# Patient Record
Sex: MALE | Race: WHITE | URBAN - METROPOLITAN AREA
[De-identification: names, ages, dates, MRNs, and addresses within clinical notes are randomized per-mention and may not be internally consistent; named-entity substitution may affect disease eponyms.]

---

## 2020-02-04 ENCOUNTER — OFFICE VISIT (OUTPATIENT)
Dept: DERMATOLOGY | Facility: AMBULATORY SURGERY CENTER | Age: 85
End: 2020-02-04

## 2020-02-04 VITALS
OXYGEN SATURATION: 96 % | HEIGHT: 68 IN | SYSTOLIC BLOOD PRESSURE: 124 MMHG | WEIGHT: 140 LBS | TEMPERATURE: 97.9 F | BODY MASS INDEX: 21.22 KG/M2 | DIASTOLIC BLOOD PRESSURE: 62 MMHG | HEART RATE: 58 BPM

## 2020-02-04 DIAGNOSIS — C44.219 BASAL CELL CARCINOMA (BCC) OF HELIX OF LEFT EAR: Primary | ICD-10-CM

## 2020-02-04 RX ORDER — OMEPRAZOLE 20 MG/1
20 CAPSULE, DELAYED RELEASE ORAL
COMMUNITY

## 2020-02-04 RX ORDER — FINASTERIDE 5 MG/1
5 TABLET, FILM COATED ORAL
COMMUNITY

## 2020-02-04 RX ORDER — PRAVASTATIN SODIUM 80 MG/1
80 TABLET ORAL
COMMUNITY

## 2020-02-04 RX ORDER — CEPHALEXIN 500 MG/1
500 CAPSULE ORAL 3 TIMES DAILY
Qty: 21 CAP | Refills: 0 | Status: SHIPPED | OUTPATIENT
Start: 2020-02-04 | End: 2020-02-11

## 2020-02-04 RX ORDER — ACETAMINOPHEN 325 MG/1
TABLET ORAL
COMMUNITY

## 2020-02-04 RX ORDER — HYDROCHLOROTHIAZIDE 25 MG/1
25 TABLET ORAL
COMMUNITY

## 2020-02-04 RX ORDER — POTASSIUM CHLORIDE 750 MG/1
10 CAPSULE, EXTENDED RELEASE ORAL
COMMUNITY

## 2020-02-04 RX ORDER — ACETAMINOPHEN 500 MG
TABLET ORAL
COMMUNITY

## 2020-02-04 RX ORDER — TERAZOSIN 2 MG/1
2 CAPSULE ORAL
COMMUNITY

## 2020-02-04 RX ORDER — METOPROLOL TARTRATE 25 MG/1
25 TABLET, FILM COATED ORAL
COMMUNITY

## 2020-02-04 RX ORDER — ISOSORBIDE MONONITRATE 30 MG/1
30 TABLET, EXTENDED RELEASE ORAL
COMMUNITY

## 2020-02-04 RX ORDER — OXYCODONE AND ACETAMINOPHEN 10; 325 MG/1; MG/1
1 TABLET ORAL
COMMUNITY

## 2020-02-04 RX ORDER — DOCUSATE SODIUM 100 MG/1
100 CAPSULE, LIQUID FILLED ORAL 2 TIMES DAILY
COMMUNITY

## 2020-02-04 RX ORDER — LEVOTHYROXINE SODIUM 50 UG/1
50 TABLET ORAL
COMMUNITY

## 2020-02-04 RX ORDER — CLOPIDOGREL BISULFATE 75 MG/1
75 TABLET ORAL
COMMUNITY

## 2020-02-04 RX ORDER — AMLODIPINE BESYLATE 5 MG/1
5 TABLET ORAL
COMMUNITY

## 2020-02-04 NOTE — PROGRESS NOTES
Yvette Salazar is a 80 y.o. male who is seen post-Mohs surgery to evaluate:  Chief Complaint:  Wound resulting from surgical extirpation of a skin cancer. 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 The size of the defect is 2.5 x 2.1 cm. The site is bleeding and is not painful. The tumor was removed on 2/4/2020. Outpatient Encounter Medications as of 2/4/2020   Medication Sig Dispense Refill    amLODIPine (NORVASC) 5 mg tablet Take 5 mg by mouth.  terazosin (HYTRIN) 2 mg capsule Take 2 mg by mouth.  pravastatin (PRAVACHOL) 80 mg tablet Take 80 mg by mouth.  hydroCHLOROthiazide (HYDRODIURIL) 25 mg tablet Take 25 mg by mouth.  levothyroxine (SYNTHROID) 50 mcg tablet Take 50 mcg by mouth.  metoprolol tartrate (LOPRESSOR) 25 mg tablet Take 25 mg by mouth.  potassium chloride SA (MICRO-K) 10 mEq capsule Take 10 mEq by mouth.  omeprazole (PRILOSEC) 20 mg capsule Take 20 mg by mouth.  isosorbide mononitrate ER (IMDUR) 30 mg tablet Take 30 mg by mouth.  cholecalciferol (VITAMIN D3) (2,000 UNITS /50 MCG) cap capsule Take  by mouth.  finasteride (PROSCAR) 5 mg tablet Take 5 mg by mouth.  clopidogreL (PLAVIX) 75 mg tab Take 75 mg by mouth.  oxyCODONE-acetaminophen (PERCOCET 10)  mg per tablet Take 1 Tab by mouth.  acetaminophen (TYLENOL) 325 mg tablet Take  by mouth every four (4) hours as needed for Pain.  docusate sodium (COLACE) 100 mg capsule Take 100 mg by mouth two (2) times a day.  cephALEXin (KEFLEX) 500 mg capsule Take 1 Cap by mouth three (3) times daily for 7 days. 21 Cap 0     No facility-administered encounter medications on file as of 2/4/2020.       Allergies   Allergen Reactions    Ativan [Lorazepam] Other (comments)    Clonidine Other (comments)    Codeine Other (comments)    Lisinopril Other (comments)    Niacin Other (comments)    Prednisone Other (comments)       Social History     Tobacco Use   Smoking Status Former Smoker       Physical Exam  HENT:      Head: Normocephalic. Pulmonary:      Effort: Pulmonary effort is normal.   Skin:     Comments: On the left anterior helix there is a broad surgical defect extending focally through cartilage to postauricular skin. Helical rim cartilage is intact structures stable. The majority of the cartilage has intact perichondrium. Neurological:      Mental Status: He is alert and oriented to person, place, and time. Evaluation and Management:  There are symptomatic surgical defects as noted above. Treatment options discussed with the patient include second intent healing, primary closure, adjacent tissue transfer (flap), and skin graft (full or split thickness) without cartilage grafting. Following evaluation of the defect(s) and discussion with the patient regarding risks and benefits of the various options, the plan to repair the defect with a(n) split-thickness skin graft was chosen. This repair was chosen in order to avoid excessive wound tension, preserve anatomic form or function and avoid free margin distortion. The wound management options of second intent healing, layered closure, local flap, or skin graft were discussed. Mr. Kelly Goyal understands the aims, risks, alternatives, and possible complications and elects to proceed with a split thickness skin graft. Mr. Kelly Goyal was placed in a supine position on the operating table in the Mohs surgery procedure room. The area was prepped and draped in the standard manner. A template of donor skin the size of the wound was drawn at the mastoid skin. 1% lidocaine with epinephrine 1:100,000 was used to anesthetize the donor area. The graft was removed from the donor site in split-thickness fashion using a flexible blade. Hemostasis was obtained with spot electrocoagulation and pressure.   The graft was placed on the recipient site, trimmed to size and anchored in place with a running 5-0 fast absorbing gut suture around the margin. A bolster was applied. The size of the graft was 2.5 x 2.1 cm. A pressure dressing utilizing Petrolatum ointment, Telfa, gauze and Coverroll was placed on the donor site. Wound care instructions (written and/or verbal) and a follow up appointment were given to the patient before discharge. Mr. Errol Wick was discharged in good condition. Follow-up and Dispositions    · Return in about 1 week (around 2/11/2020) for Bolster removal.         Amberly Yanez MD    [address]   OFFICE PROCEDURE PROGRESS NOTE   Chart reviewed for the following:   Imer Clemons MD have reviewed the History, Physical and updated the Allergic reactions for [facility]. TIME OUT performed immediately prior to start of procedure:   Imer Clemons MD, have performed the following reviews on [facility]   prior to the start of the procedure:     * Patient was identified by name and date of birth   * Agreement on procedure being performed was verified   * Risks and Benefits explained to the patient   * Procedure site verified and marked as necessary   * Patient was positioned for comfort   * Consent was signed and verified     Time: 8 AM  Date of procedure: 2/4/2020  Procedure performed by:  Cherelle Lopez MD  Provider assisted by: Anurag Rooney LPN  Patient assisted by: self   How tolerated by patient: tolerated the procedure well with no complications   Comments: none

## 2020-02-04 NOTE — PATIENT INSTRUCTIONS
WOUND CARE INSTRUCTIONS     1. Keep the dressing clean and dry and do not remove for 1 week. 2. Watch for:  BLEEDING: A small amount of drainage may occur. If bleeding occurs, elevate and rest the surgery site. Apply gauze and steady pressure for 20 minutes. If bleeding continues repeat pressure once more. If bleeding still does not stop, call this office. If after hours, call Dr. Deisy Dias at [phone]. INFECTION: Signs of infection include increased redness, pain, warmth, drainage of pus, and fever. If this occurs, please call this office. 3. Special Instructions (follow any that are checked):  · [x] You have stitches that DO NOT need to be removed. · [x] Avoid bending at the waist and heavy lifting for two days. · [x] Sleep with your head elevated for the next two nights. · [] Rest the surgery site and keep it elevated as much as possible for two days. · [] You may apply an ice-pack for 10-15 minutes every waking hour for the rest of the day. · [] Eat a soft diet and avoid hot food and hot drinks for the rest of the day. · [] Other instructions: Follow up as directed. Take Tylenol  for pain as needed. Once the site is healed with no remaining bandages or open areas, protect your surgical site and scar from the sun, as this area will be more sensitive. Use a broad spectrum sunscreen SPF 30 or higher daily, and a chemical free product (one containing zinc oxide or titanium dioxide) is a good choice if the area is sensitive. You may begin to gently massage the surgical site in 3 weeks, rubbing in a circular motion along the scar. This can help reduce swelling and thickness of a scar. If you have any questions or concerns, please call our office Monday through Friday at [phone]. If after hours, please call Dr. Deisy Dias at [phone].

## 2020-02-04 NOTE — LETTER
2/4/2020 3:21 PM 
 
Patient:  Jakob Yanes YOB: 1931 Date of Visit: 2/4/2020 Dear Rosie Bell MD 
[address] [facility] [address] [place] [address] [phone] VIA Facsimile: [phone] Thank you for referring Jakob Yanes to me for evaluation/treatment. Below are the relevant portions of my assessment and plan of care. Mr. Bennie Sahu presented today for Mohs surgery to treat a biopsy-proven basal cell carcinoma of the left ear. 2 stage(s) of Mohs surgery were required to achieve tumor free margins. I repaired the defect with a(n) skin graft. He tolerated the procedure well. Please see the attached procedure note(s) for additional details. Mr. Bennie Sahu will return to me for suture removal and/or wound checks at an appropriate interval and I will follow-up with him regarding any issues arising from or relating to this surgery. I will otherwise defer any additional dermatologic care back to you. If you have questions, please do not hesitate to call me. I look forward to following Mr. Lundy along with you. Sincerely, Ilda Licona MD 
[phone] (cell)

## 2020-02-04 NOTE — PROGRESS NOTES
Progress note for Mohs surgery patient:    Chief Complaint:  basal cell carcinoma of the left ear    HPI:  Tommie Carlson is a 80y.o. year old male referred by  Yamile Benitez MD for Mohs surgery to treat the following lesion:  Lesion Info  Location: left ear  Size: 2.3 cm x 1.2 cm  Type: basal  Duration: months  Path Lab: [facility]  Path #: [id number]  Prior Treatment: none     Symptoms of the lesion include none. ROS:  Madhu Franklin is feeling well and in their usual state of health today. He is not in pain. He does not have any other skin concerns. Exam:  Madhu Franklin is an awake, alert, oriented, well-appearing male in no distress. There is not preauricular, submandibular, or cervical lymphadenopathy. The face was examined. Findings are:  On the left anterior helix there is a broad pink sclerotic plaque. A/P:  basal cell carcinoma of the left ear. The diagnosis was reviewed. The Mohs surgery procedure was reviewed. Indications, risks, and options were discussed with Mr. Adi Joseph preoperatively. Risks including, but not limited to: pain, bleeding, infection, tumor recurrence, scarring and damage to motor and/or sensory nerves, were discussed. Mr. Adi Joseph chose Mohs surgery. Mr. Adi Joseph was an acceptable surgery candidate. I performed Mohs surgery using standard technique after verbal and written consent were obtained. The lesion was identified and confirmed with the patient and photograph, if available. The surgical site was marked with gentian violet, prepped, draped and anesthetized in standard fashion. The tumor was debulked by curettage and orientation hashes were placed. The tumor and any associated scar was excised using beveled incision. Hemostasis was achieved, the site was bandaged, and the tissue was transported to the Mohs lab. While maintaining anatomic orientation the tissue was divided, if needed, and marked with colored inks that were noted on the corresponding Mohs map.   The tissue was prepared by Mohs en-face technique for fresh frozen section analysis. The resulting slides were examined for residual tumor, scar and other concerns, all of which were marked on the corresponding Mohs map, if present. The Mohs map was used to guide subsequent stages of surgery, if necessary, and the above process was repeated until a tumor-free plane was achieved. Once the tumor was cleared the map was marked as such and signed. Dr. Mell Edward acted as surgeon and pathologist for the entire case, performing all stages of the surgical excision as well as examination and interpretation of the histologic slides. See table below for details regarding the surgical case. 2 stage(s) were required to reach a tumor-free plane, resulting in a 2.5 cm x 2.1 cm defect extending to the cartiledge. There were not complications. Irene Arias will follow up as needed in the postoperative period. Regular skin examinations will be with  Dermatology at the [facility]. See repair note for reconstructive details.             left ear  Mohs Lesion Operative Report  Date: 02/04/20  Room: Procedure room 1  Indications: Site, Size, Poor definition  Pre-op Meds: none  Pre-op BP: 124/62  Pre-op pulse: 58  1st Assistant: Josef Molina  Stage #: 2  Stage 1 Sections: 1  Stage 1 # Pos: 1  Stage 2 Sections: 1  Stage 2 # POS: 0  Perineural Involvement: No  Lymphadenopathy: No  Defect Size: 2.5 cm x 2.1 cm  Depth: cartiledge  Wound Mgt: graft  Donor Site: mastoid skin  Suture: Graft  Graft details: 5.0 fast gut  Closure Length: 2.5 cm x 2.1 cm  Estimated Blood Loss: 1 ml  Hemostasis: Electrosurgery, Pressure  Anesthesia: 1% Lidocaine w/1:100,000 epi  1% Lidocaine: 6 cc  Complications: none  Dressing: pressure  Pos-op Meds: keflex  W/C Instructions: Verbal, Written  Follow-up: 1 week     [facility]   OFFICE PROCEDURE PROGRESS NOTE   Chart reviewed for the following:   Michele Almaraz MD have reviewed the History, Physical and updated the Allergic reactions for Fela Zhou. TIME OUT performed immediately prior to start of procedure:   Anuja Jarrett MD, have performed the following reviews on Fela Zhou   prior to the start of the procedure:     * Patient was identified by name and date of birth   * Agreement on procedure being performed was verified   * Risks and Benefits explained to the patient   * Procedure site verified and marked as necessary   * Patient was positioned for comfort   * Consent was signed and verified     Time: 8 AM  Date of procedure: 2/4/2020  Procedure performed by:  Stephanie Lozada MD  Provider assisted by: Bradford Price  Patient assisted by: self   How tolerated by patient: tolerated the procedure well with no complications   Comments: none

## 2020-02-11 ENCOUNTER — OFFICE VISIT (OUTPATIENT)
Dept: DERMATOLOGY | Facility: AMBULATORY SURGERY CENTER | Age: 85
End: 2020-02-11

## 2020-02-11 DIAGNOSIS — C44.219 BASAL CELL CARCINOMA (BCC) OF HELIX OF LEFT EAR: Primary | ICD-10-CM

## 2020-02-11 NOTE — PROGRESS NOTES
Wound check/suture removal:    Chief complaint: wound check. HPI: [place] Cuadra presents for wound check following Mohs surgery to treat a biopsy-proven basal cell carcinoma on the left helix repaired with a skin graft performed 1 week ago. Exam: The surgical site was examined. There is not evidence of infection. There is erythema. There is not edema. A/P:  Wound check. The surgical site is healing well, however there are some small areas of hematoma under the graft. Additional care was reviewed including liberal application of Vaseline several times daily to the donor site and protection of the graft site. Follow up will be in 1 week.

## 2020-02-18 ENCOUNTER — OFFICE VISIT (OUTPATIENT)
Dept: DERMATOLOGY | Facility: AMBULATORY SURGERY CENTER | Age: 85
End: 2020-02-18

## 2020-02-18 DIAGNOSIS — C44.219 BASAL CELL CARCINOMA (BCC) OF HELIX OF LEFT EAR: Primary | ICD-10-CM

## 2020-02-18 RX ORDER — CEPHALEXIN 500 MG/1
500 CAPSULE ORAL 3 TIMES DAILY
Qty: 21 CAP | Refills: 0 | Status: SHIPPED | OUTPATIENT
Start: 2020-02-18 | End: 2020-02-25

## 2020-02-18 NOTE — PROGRESS NOTES
Wound check/suture removal:    Chief complaint: wound check. HPI: [place]on Angeles presents for wound check following Mohs surgery to treat a basal cell carcinoma on the left ear repaired with a skin graft performed 2 weeks ago. Exam: The surgical site was examined. There is not evidence of infection. There is erythema. There is not edema. A/P:  Wound check. The surgical site is healing well. Additional care was reviewed including liberal application of Vaseline several times daily vinegar soaks. Follow up will be in 1 week. Keflex was restarted today for 1 week.

## 2020-02-25 ENCOUNTER — OFFICE VISIT (OUTPATIENT)
Dept: DERMATOLOGY | Facility: AMBULATORY SURGERY CENTER | Age: 85
End: 2020-02-25

## 2020-02-25 DIAGNOSIS — C44.219 BASAL CELL CARCINOMA (BCC) OF HELIX OF LEFT EAR: Primary | ICD-10-CM

## 2020-02-25 NOTE — PROGRESS NOTES
Wound check/suture removal:    Chief complaint: wound check. HPI: [place] Hernandez presents for wound check following Mohs surgery to treat a biopsy-proven basal cell carcinoma on the left helix repaired with a skin graft performed 3 weeks ago. Exam: The surgical site was examined. There is not evidence of infection. There is erythema. There is not edema. A/P:  Wound check. The surgical site is healing well. Additional care was reviewed including liberal application of Vaseline several times daily and vinegar soaks. Follow up will be in 1 month.

## 2023-05-20 RX ORDER — PSEUDOEPHEDRINE HCL 30 MG
100 TABLET ORAL 2 TIMES DAILY
COMMUNITY

## 2023-05-20 RX ORDER — POTASSIUM CHLORIDE 750 MG/1
10 CAPSULE, EXTENDED RELEASE ORAL
COMMUNITY

## 2023-05-20 RX ORDER — ACETAMINOPHEN 325 MG/1
TABLET ORAL EVERY 4 HOURS PRN
COMMUNITY

## 2023-05-20 RX ORDER — OMEPRAZOLE 20 MG/1
20 CAPSULE, DELAYED RELEASE ORAL
COMMUNITY

## 2023-05-20 RX ORDER — CLOPIDOGREL BISULFATE 75 MG/1
75 TABLET ORAL
COMMUNITY

## 2023-05-20 RX ORDER — FINASTERIDE 5 MG/1
5 TABLET, FILM COATED ORAL
COMMUNITY

## 2023-05-20 RX ORDER — AMLODIPINE BESYLATE 5 MG/1
5 TABLET ORAL
COMMUNITY

## 2023-05-20 RX ORDER — LEVOTHYROXINE SODIUM 0.05 MG/1
50 TABLET ORAL
COMMUNITY

## 2023-05-20 RX ORDER — OXYCODONE AND ACETAMINOPHEN 10; 325 MG/1; MG/1
1 TABLET ORAL
COMMUNITY

## 2023-05-20 RX ORDER — HYDROCHLOROTHIAZIDE 25 MG/1
25 TABLET ORAL
COMMUNITY

## 2023-05-20 RX ORDER — TERAZOSIN 2 MG/1
2 CAPSULE ORAL
COMMUNITY

## 2023-05-20 RX ORDER — PRAVASTATIN SODIUM 80 MG/1
80 TABLET ORAL
COMMUNITY

## 2023-05-20 RX ORDER — ISOSORBIDE MONONITRATE 30 MG/1
30 TABLET, EXTENDED RELEASE ORAL
COMMUNITY